# Patient Record
Sex: FEMALE | Race: WHITE
[De-identification: names, ages, dates, MRNs, and addresses within clinical notes are randomized per-mention and may not be internally consistent; named-entity substitution may affect disease eponyms.]

---

## 2023-01-27 ENCOUNTER — HOSPITAL ENCOUNTER (OUTPATIENT)
Dept: HOSPITAL 90 - RAH | Age: 73
Discharge: HOME | End: 2023-01-27
Attending: STUDENT IN AN ORGANIZED HEALTH CARE EDUCATION/TRAINING PROGRAM
Payer: MEDICARE

## 2023-01-27 DIAGNOSIS — R53.1: ICD-10-CM

## 2023-01-27 DIAGNOSIS — R29.898: ICD-10-CM

## 2023-01-27 DIAGNOSIS — R29.6: ICD-10-CM

## 2023-01-27 DIAGNOSIS — M48.07: Primary | ICD-10-CM

## 2023-01-27 DIAGNOSIS — Z98.1: ICD-10-CM

## 2023-01-27 DIAGNOSIS — Z91.09: ICD-10-CM

## 2023-01-27 DIAGNOSIS — M54.41: ICD-10-CM

## 2023-01-27 LAB
BUN SERPL-MCNC: 23 MG/DL (ref 7–18)
CHLORIDE SERPL-SCNC: 99 MMOL/L (ref 101–111)
CO2 SERPL-SCNC: 34 MMOL/L (ref 21–32)
CREAT SERPL-MCNC: 1.4 MG/DL (ref 0.5–1.5)
GFR SERPL CREATININE-BSD FRML MDRD: 39 ML/MIN (ref 60–?)
GLUCOSE SERPL-MCNC: 76 MG/DL (ref 70–105)
POTASSIUM SERPL-SCNC: 3.6 MMOL/L (ref 3.5–5.1)
SODIUM SERPL-SCNC: 138 MMOL/L (ref 136–145)

## 2023-01-27 PROCEDURE — 80048 BASIC METABOLIC PNL TOTAL CA: CPT

## 2023-01-27 PROCEDURE — 36415 COLL VENOUS BLD VENIPUNCTURE: CPT

## 2023-02-01 ENCOUNTER — HOSPITAL ENCOUNTER (OUTPATIENT)
Dept: HOSPITAL 90 - RAH | Age: 73
Discharge: HOME | End: 2023-02-01
Attending: STUDENT IN AN ORGANIZED HEALTH CARE EDUCATION/TRAINING PROGRAM
Payer: MEDICARE

## 2023-02-01 DIAGNOSIS — Z98.1: ICD-10-CM

## 2023-02-01 DIAGNOSIS — M48.061: Primary | ICD-10-CM

## 2023-02-01 DIAGNOSIS — M54.41: ICD-10-CM

## 2023-02-01 DIAGNOSIS — R29.6: ICD-10-CM

## 2023-02-01 DIAGNOSIS — M48.07: ICD-10-CM

## 2023-02-01 PROCEDURE — 72158 MRI LUMBAR SPINE W/O & W/DYE: CPT

## 2025-02-14 NOTE — ERN
General


Chief Complaint:  Knee Injury/Swelling


Stated Complaint:  LEFT KNEE WOUND


Time Seen by MD:  13:14


Source:  patient





History of Present Illness


Initial Comments


PATIENT IS A 74-YEAR-OLD FEMALE COMING IN TO BE EVALUATED FOR LEFT KNEE LESION. 

PATIENT STATES THAT THE LESION BEGAN A WEEK AGO.  SHE ALSO STATES THAT SHE HIT 

HER KNEE WITH THE CORNER OF A FURNITURE MADE HER KNEE SWELL UP A BIT SHE STARTED

PUTTING A CREAM THAT WAS RECOMMENDED BY ONE OF HER FRIENDS.  SHE STATES THAT IS 

PARKER AFTER PUTTING THE CREAM SOME OF THE SKIN STARTED BEING COMING IRRITATED.  

SHE STOPPED USING THAT MEDICATION SHORTLY AFTER BUT NOTICED THAT THE INF

LAMMATION CONTINUED SOME OF THE SKIN PEELED OFF.


Allergies:  


Coded Allergies:  


     No Known Drug Allergies (Unverified  Allergy, Unknown, 2/14/25)





Past Medical History


Past Medical History:  No Pertinent History


Past Surgical History:  Other


Surgical History Other:  BACK SX; BILATERAL KNEE REPLACEMENTS





ROS Dictation


CONSTITUTIONAL:  NO CHILLS, NO FEVER, NO WEAKNESS, NO DIAPHORESIS, NO MALAISE.


HEAD/FACE:  NO SIGNS OF TRAUMA. 


EENT:  NO EYE PAIN, NO BLURRED VISION, NO TEARING, NO DOUBLE VISION, NO EAR 

PAIN, NO EAR DISCHARGE, NO NOSE PAIN, NO NASAL CONGESTION, NO THROAT PAIN, NO 

THROAT SWELLING, NO MOUTH PAIN. 


RESPIRATORY:  NO COUGH, NO ORTHOPNEA, NO SOB, NO STRIDOR, NO WHEEZING. 


CARDIOVASCULAR:  NO CHEST PAIN, NO EDEMA, NO PALPITATIONS, NO SYNCOPE. 


GASTROINTESTINAL/ABDOMINAL:   NO ABDOMINAL PAIN, NO CONSTIPATION, NO DIARRHEA, 

NO NAUSEA, NO VOMITING. 


GENITOURINARY:  NO ABNORMAL DISCHARGE, NO DYSURIA, NO FREQUENT URINATION, NO 

HEMATURIA. NO COMPLAINTS OF PAIN IN THE GENITALS. 


MUSCULOSKELETAL:  NO BACK PAIN, NO GOUT, NO JOINT PAIN, NO JOINT SWELLING, NO 

MUSCLE PAIN, NO MUSCLE STIFFNESS, NO NECK PAIN. 


INTEGUMENTARY:  NO CHANGE IN COLOR, NO CHANGE IN HAIR/NAILS, NO DRYNESS, NO 

LESION, NO LUMPS,  RASH. 


NEUROLOGICAL/PSYCH:  NO ANXIETY, NOT DEPRESSED, NO EMOTIONAL PROBLEM, NO 

HEADACHE, NO NUMBNESS, NO PRE-EXISTING DEFICIT, NO HISTORY OF SEIZURES,  NO 

TREMORS, NO WEAKNESS. 


HEMATOLOGIC/LYMPHATIC:  NOT ANEMIC, NO HISTORY OF BLOOD CLOTS, NO APPARENT 

BLEEDING, NO BRUISING, GLANDS NOT SWOLLEN.


ALL SYSTEMS NEGATIVE, EXCEPT AS NOTED.





Physical Exam


Physical Exam Dictation


VITAL SIGNS:  REVIEWED. 


GENERAL APPEARANCE:  ALERT, ORIENTED X3, NO ACUTE DISTRESS, OBESE.


HEAD AND FACE: NON-TRAUMATIC. 


EYES:   PERRL, PINK CONJUNCTIVAS, EYELID NO TRAUMA, ANTERIOR CHAMBER CLEAR. 


EARS:  PINNAS INTACT AND NO SIGNS OF TRAUMA OR ERYTHEMA.  EAR CANALS CLEAR AND 

NO DISCHARGE. TMS NO ERYTHEMA. 


NOSE:  NO DISCHARGE, NO BLEEDING. 


OROPHARYNX:  MOUTH NORMAL, TEETH NO CARIES, TONGUE PINK.  PHARYNX CLEAR, NO 

ERYTHEMA.  TONSILS NO EXUDATES, NO ABSCESSES NOTED. MUCOUS MEMBRANE MOIST.


NECK:  SUPPLE, NON-TENDER, NO THYROMEGALY, NO MASSES, NO JVD, NO BRUITS. 


BREAST:  DEFERRED.


CHEST:   NO TENDERNESS, NO CREPITUS, NO PARADOXICAL MOVEMENT, NO RETRACTIONS.


LUNGS:  CLEAR, WELL-VENTILATED, SYMMETRIC, NO RALES, NO WHEEZING, NO RHONCHI, NO

STRIDOR, GOOD BREATH SOUNDS BILATERALLY. 


HEART:  REGULAR RATE, REGULAR RHYTHM, NO MURMUR, NO GALLOPS. 


VASCULAR: NO PERIPHERAL EDEMA.


ABDOMEN: SOFT, POSITIVE BOWEL SOUNDS, NONDISTENDED, NO GUARDING, NONTENDER, NO 

REBOUND, NO MASSES NO HEPATOMEGALY, NO SPLENOMEGALY, NO ALBA'S SIGN, NO 

HERNIAS. 


RECTAL: DEFERRED. 


GENITAL:  DEFERRED. 


NEUROLOGICAL:  NORMAL SPEECH, GROSS MOTOR FUNCTION INTACT, GROSS SENSORY 

FUNCTION INTACT.


MUSCULOSKELETAL:  NECK NONTENDER, FULL RANGE OF MOTION, BACK NONTENDER, FULL 

RANGE OF MOTION.


EXTREMITIES: NONTENDER, FULL RANGE OF MOTION. 


SKIN:  COLOR PINK, DRY, NO TURGOR, NO RASH, NO LACERATIONS, LEFT KNEE MEDIAL 

ABRASIONS, PEDAL EDEMA WITH WEEPING OF THE ABRASION, NO CONTUSIONS.


LYMPHATICS:  DEFERRED.





Results


Laboratory and Microbiology


Labs Reviewed?:  Yes





MDM


MDM: 


DIFFERENTIAL DIAGNOSIS:  CELLULITIS, ABRASION, SKIN BREAKDOWN DUE TO VENOUS 

STASIS,


PATIENT IS A 74-YEAR-OLD FEMALE COMING IN TO BE EVALUATED FOR LEFT KNEE WOUND.  

ON PHYSICAL EXAM THERE IS ERYTHEMA AND WEEPING OF THE SKIN.  PATIENT HAS BEEN 

APPLYING A NON BREATHABLE GAUZE, WOUND WAS CLEANED THOROUGHLY AND STERILIZED 

COVERED WITH A BREATHABLE GAUZE.  I ADVISED PATIENT TO CONTINUE COVERING WITH A 

BREATHABLE GAUZE AVOID TOUCHING PATIENT WAS ALREADY ON BACTRIM SO I ADVISED 

CONTINUING ON THAT.  PATIENT WILL BE DISCHARGED IN STABLE CONDITION.


ED Course





Vital Signs








  Date Time  Temp Pulse Resp B/P (MAP) Pulse Ox O2 Delivery O2 Flow Rate FiO2


 


2/14/25 13:16 98.4 75 16 130/66 98 Room Air  











DX & DISP


Disposition:  Discharge


Departure


Impression:  


   Primary Impression:  Encounter for evaluation of wound


Condition:  Stable





Additional Instructions:  


FOLLOW-UP WITH PRIMARY CARE PROVIDER IN 1 TO 2 DAYS.  TAKE MEDICATIONS AS D

IRECTED HERE IN THE EMERGENCY ROOM.  OKAY TO CONTINUE HOME MEDICATIONS UNLESS 

OTHERWISE DISCUSSED DURING YOUR VISIT IN THE EMERGENCY ROOM TODAY.  RETURN TO 

YOUR NEAREST EMERGENCY ROOM IF SYMPTOMS WORSEN OR IF THERE IS NO IMPROVEMENT.  

CALL 911 IF YOU NEED IMMEDIATE ASSISTANCE.  TAKE TYLENOL  OVER-THE-COUNTER AS 

NEEDED AND IF NO CONTRAINDICATIONS ARE PRESENT.  INCREASE ORAL HYDRATION.  A 

WOUND CULTURE OR URINE CULTURE WAS ORDERED HERE IN THE EMERGENCY ROOM DEPARTMENT

PLEASE FOLLOW-UP WITH PRIMARY CARE PROVIDER AND ADVISE THEM TO GET REPEAT PORTS 

FROM OUR FACILITY.  IF YOU HAD ANY ACE WRAP/SPLINTS THAT WERE APPLIED HERE, 

PLEASE DO NOT REMOVE THEM UNTIL YOU SEE YOUR PRIMARY CARE OR SPECIALTY.





REFERRALS:


Referrals:  


NALDO LY (PCP)


Time of Disposition:  14:00











ERMA MYERS MD              Feb 14, 2025 14:00